# Patient Record
Sex: FEMALE | Race: WHITE | NOT HISPANIC OR LATINO | Employment: OTHER | URBAN - METROPOLITAN AREA
[De-identification: names, ages, dates, MRNs, and addresses within clinical notes are randomized per-mention and may not be internally consistent; named-entity substitution may affect disease eponyms.]

---

## 2024-07-10 ENCOUNTER — OFFICE VISIT (OUTPATIENT)
Dept: GYNECOLOGY | Facility: CLINIC | Age: 69
End: 2024-07-10
Payer: MEDICARE

## 2024-07-10 VITALS
DIASTOLIC BLOOD PRESSURE: 82 MMHG | BODY MASS INDEX: 25.66 KG/M2 | SYSTOLIC BLOOD PRESSURE: 110 MMHG | HEIGHT: 65 IN | WEIGHT: 154 LBS

## 2024-07-10 DIAGNOSIS — N81.2 CYSTOCELE WITH SECOND DEGREE UTERINE PROLAPSE: Primary | ICD-10-CM

## 2024-07-10 PROCEDURE — 99203 OFFICE O/P NEW LOW 30 MIN: CPT | Performed by: OBSTETRICS & GYNECOLOGY

## 2024-07-10 RX ORDER — OMEPRAZOLE 20 MG/1
20 CAPSULE, DELAYED RELEASE ORAL DAILY
COMMUNITY
Start: 2024-05-27

## 2024-07-10 RX ORDER — BUDESONIDE AND FORMOTEROL FUMARATE DIHYDRATE 80; 4.5 UG/1; UG/1
2 AEROSOL RESPIRATORY (INHALATION) 2 TIMES DAILY
COMMUNITY

## 2024-07-10 NOTE — PROGRESS NOTES
"Assessment/Plan:    Diagnoses and all orders for this visit:    Cystocele with second degree uterine prolapse    Other orders  -     omeprazole (PriLOSEC) 20 mg delayed release capsule; Take 20 mg by mouth daily Take before a meal  -     fexofenadine (ALLEGRA ODT) 30 MG disintegrating tablet; Take 30 mg by mouth daily  -     budesonide-formoterol (SYMBICORT) 80-4.5 MCG/ACT inhaler; Inhale 2 puffs 2 (two) times a day Rinse mouth after use.    History of tubal ligation      Subjective: Vaginal bulge    Social history , retired, denies tobacco abuse or alcohol abuse.   Patient ID: Thania Bunch is a 68 y.o. female.    HPI  68-year-old female  2 para 2.  Occasionally has issues with constipation, she does occasionally have to strain.  Denies hot flashes or vaginal dryness.  She is no longer sexually active.  Recently moved from [place] was doing a lot of lifting.  Concern with possible uterine prolapse.  Exam here this morning with chaperone present cystocele with second-degree uterine prolapse present.  Denies DAMON symptoms or other issues with bladder leakage.  She does do Kegel exercises, recommend she continue doing this.  Follow-up in about 3 months time for annual exam and follow-up care.  No intervention needed at this time for minimally symptomatic second-degree  uterine prolapse    Review of Systems   Respiratory: Negative.     Cardiovascular: Negative.    Gastrointestinal: Negative.    Neurological: Negative.    Psychiatric/Behavioral: Negative.     All other systems reviewed and are negative.      Objective: No acute distress  /82 (BP Location: Left arm, Patient Position: Sitting, Cuff Size: Standard)   Ht 5' 5.25\" (1.657 m)   Wt 69.9 kg (154 lb)   BMI 25.43 kg/m²    Physical Exam  Vitals and nursing note reviewed. Exam conducted with a chaperone present.   Constitutional:       Appearance: Normal appearance. She is normal weight.   HENT:      Head: Normocephalic.   Eyes:      " Extraocular Movements: Extraocular movements intact.   Pulmonary:      Effort: Pulmonary effort is normal.   Abdominal:      General: Abdomen is flat.   Genitourinary:     General: Normal vulva.      Comments: Normal external genitalia  Normal size uterus, anteverted, second-degree uterine prolapse with small cystocele present.  Normal cervix  No CMT  No pelvic masses  Musculoskeletal:         General: Normal range of motion.      Cervical back: Normal range of motion.   Skin:     General: Skin is warm and dry.   Neurological:      Mental Status: She is alert and oriented to person, place, and time.   Psychiatric:         Mood and Affect: Mood normal.         Behavior: Behavior normal.         Thought Content: Thought content normal.         Judgment: Judgment normal.        Please note    In addition to the time spent discussing the findings and results of today's visit and exam, I spent approximately 30 minutes of face-to-face time with the patient, greater than 50% of which was spent in counseling and coordination of care for this patient.

## 2024-12-11 ENCOUNTER — EVALUATION (OUTPATIENT)
Age: 69
End: 2024-12-11
Payer: MEDICARE

## 2024-12-11 DIAGNOSIS — N81.4 UTEROVAGINAL PROLAPSE: Primary | ICD-10-CM

## 2024-12-11 PROCEDURE — 97162 PT EVAL MOD COMPLEX 30 MIN: CPT | Performed by: PHYSICAL THERAPIST

## 2024-12-11 NOTE — PROGRESS NOTES
PT Evaluation     Today's date: 2024  Patient name: Thania Bunch  : 1955  MRN: [id number]  Referring provider: Georgi Sanchez MD  Dx:   Encounter Diagnosis     ICD-10-CM    1. Uterovaginal prolapse  N81.4                      Assessment  Impairments: abnormal coordination, abnormal muscle firing, abnormal muscle tone, impaired physical strength and lacks appropriate home exercise program    Assessment details: Thania Bunch is a 69 y.o. female who presents to physical therapy with pelvic organ prolapse. Internal assessment will be performed at [facility] due to time constraint during IE. Functional impairments include occasional DAMON, feeling of heaviness with prolonged standing and walking, and heaviness with lifting. Physical findings include pelvic floor weakness, poor pelvic floor endurance, fair coordination among breathing, core, and pelvic floor muscles, and pelvic organ prolapse (degree to be assessed at next visit). Thania Bunch would benefit from formal physical therapy to address impairments as detailed, decrease pain, and restore maximal level of function for all home, work, self-care, health maintenance, and mobility tasks. Thank you for this referral.    Understanding of Dx/Px/POC: good     Prognosis: good    Goals  Short term goals (to be met in 4 weeks):  1. Pt will walk for 30 minutes without increase in heaviness.  2. Pt will demonstrate pelvic floor strength at least 3/5 to lift organs against gravity.    Long term goals (to be met by discharge):  1. Pt will be compliant with HEP.  2. Pt will be able to walk or hike for at least 1 hour without increased feeling of heaviness.  3. Pt will report resolution of DAMON with coughing and sneezing.      Plan  Patient would benefit from: PT eval and skilled physical therapy  Planned modality interventions: biofeedback    Planned therapy interventions: abdominal trunk stabilization, activity modification, joint mobilization, manual therapy,  massage, Shaffer taping, neuromuscular re-education, patient education, postural training, strengthening, stretching, therapeutic activities, therapeutic exercise, behavior modification, body mechanics training, breathing training, home exercise program, IASTM and kinesiology taping    Frequency: 1x week  Duration in weeks: 12  Treatment plan discussed with: patient      PT Pelvic Floor Subjective:   History of Present Illness:   Thania and her  moved from [place] to [place] in March 2024, did a lot of carrying furniture in and out of the house. She was diagnosed with level 1 or 2 prolapse, able to see cervix. She called her daughter, doctor at [facility]. Daughter suggested pelvic floor PT. Thania continues to feel heaviness into her vagina, is disrupting her lifestyle. If she has too much activity, walking around, or being up against gravity, she must sit for a while due to discomfort and feeling that things are coming down. She is an avid hiker and has had to avoid activity due to prolapse. When she sits, she does not really feel prolapse. She notices it more with standing too much, not having BM daily, and walking.  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)  Diet and Exercise:    Diet:balanced nutrition    Exercise type: walking    hiking  OB/ gyn History    Gestational History:     Prior Pregnancy: Yes      Number of prior pregnancies: 4    Number of term pregnancies: 2    Delivery Type: vaginal delivery      Number of vaginal deliveries: 2  no delivery complications    Menstrual History:      Menopausal: menopause (early 50s)  no hormone replacement therapy  Bladder Function:     Voiding Difficulties negative for: urgency, frequent urination, hesitancy, straining and incomplete emptying       Voiding Difficulties comments:      Voiding frequency: every 1-2 hours and every 3-4 hours    Urinary leakage: urine leakage (only with full bladder and cough/sneeze)    Urinary leakage aggravated by: coughing and sneezing    Urinary leakage not aggravated by: post-void dribble    Nocturia (episodes per night): 1    Painful urination: No      Fluid Intake Type:  Water and tea  Incontinence Management:     Pads/Diaper Use:  Day    Pads/Diapers Additional Comments: always wears a liner due to vaginal leakage, more for security    Patient has attempted at least 4 weeks of independent pelvic floor strengthening exercises without a resolution of symptoms  Bowel Function:     Voiding DIfficulties: unfinished feeling after defecating and constipation      Bowel Function comments:  Gets constipated when traveling, sometimes takes Miralax prn    Bowel frequency: daily and every 2 days    [place] Stool Scale: type 4  Sexual Function:     Sexually Active:  Not sexually active  Pain:     No pain reported by patient.    Onset:  7-12 months ago    Aggravating factors:  Walking and prolonged positions    Duration of symptoms:  Brief    Relieving factors:  Change in position and rest    Progression:  Worsening  Diagnostic Tests:     None    Treatments: no  Patient Goals:     Patient goals for therapy:  Return to sport/leisure activities, improved quality of life and improved comfort    Other patient goals:  Get back to more active lifestyle, be able to stand for a long time and walk daily      Objective     Static Posture     Comments  Posture WNL  Neg Gillet's B    Active Range of Motion     Lumbar   Flexion:  WFL  Extension: Active lumbar extension: slight pressure.  WFL  Left lateral flexion:  WFL  Right lateral flexion:  WFL  Left rotation:  WFL  Right rotation:  WFL    Strength/Myotome Testing     Left Hip   Planes of Motion   Flexion: 5  External rotation: 5  Internal rotation: 5    Right Hip   Planes of Motion   Flexion: 4+  External rotation: 5  Internal  rotation: 5    Additional Strength Details  History of bursitis in both hips    Tests     Left Hip   Negative GINNY, FADIR and Gillet's.   SLR: Negative.     Right Hip   Negative GINNY, FADIR and Gillet's.   SLR: Negative.       Abdominal Assessment:      Position: supine exam  Abdominal Assessment: Excellent diaphragmatic breath  No TTP      Diastatis   Connective tissue integrity at linea alba: firm  no tenderness at linea alba  able to engage transverse abdominis   Palpation of linea alba:Did not assess, history of hiatal hernia    Visual Inspection of Perineum:   PFM Contraction Comments: External palpation over EAS: good understanding of contract and relax    Pelvic Organ Prolapse   Comments: Assess at [facility]        Pelvic Floor Muscle Exam:             PERFECT Score        Perfect Score: Assess at [facility]                   Precautions: low blood pressure, acid reflux, seasonal allergies      Manuals 12/11            Internal assess nv                                                   Neuro Re-Ed             TA + PF + hip add HEP            TA + PF + TB hip abd HEP                                                                             Ther Ex                                                                                                                     Ther Activity             Bowel mechanics nv            Lifiting mechanics prn            TA + PF + STS nv            Gait Training                                       Modalities

## 2024-12-11 NOTE — LETTER
2024    Georgi Sanchez MD  [address]  [address] [place] [id number]    Patient: Thania Bunch   YOB: 1955   Date of Visit: 2024     Encounter Diagnosis     ICD-10-CM    1. Uterovaginal prolapse  N81.4           Dear Dr. Sanchez:    Thank you for your recent referral of Thania Bunch. Please review the attached evaluation summary from Thania's recent visit.     Please verify that you agree with the plan of care by signing the attached order.     If you have any questions or concerns, please do not hesitate to call.     I sincerely appreciate the opportunity to share in the care of one of your patients and hope to have another opportunity to work with you in the near future.       Sincerely,    Lolly Hoff, PT      Referring Provider:      I certify that I have read the below Plan of Care and certify the need for these services furnished under this plan of treatment while under my care.                    Georgi Sanchez MD  [address]  [address] [place] [address]  Via Fax: [phone]          PT Evaluation     Today's date: 2024  Patient name: Thania Bunch  : 1955  MRN: [id number]  Referring provider: Georgi Sanchez MD  Dx:   Encounter Diagnosis     ICD-10-CM    1. Uterovaginal prolapse  N81.4                      Assessment  Impairments: abnormal coordination, abnormal muscle firing, abnormal muscle tone, impaired physical strength and lacks appropriate home exercise program    Assessment details: Thania Bunch is a 69 y.o. female who presents to physical therapy with pelvic organ prolapse. Internal assessment will be performed at [facility] due to time constraint during IE. Functional impairments include occasional DAMON, feeling of heaviness with prolonged standing and walking, and heaviness with lifting. Physical findings include pelvic floor weakness, poor pelvic floor endurance, fair coordination among breathing,  core, and pelvic floor muscles, and pelvic organ prolapse (degree to be assessed at next visit). Thania Bunch would benefit from formal physical therapy to address impairments as detailed, decrease pain, and restore maximal level of function for all home, work, self-care, health maintenance, and mobility tasks. Thank you for this referral.    Understanding of Dx/Px/POC: good     Prognosis: good    Goals  Short term goals (to be met in 4 weeks):  1. Pt will walk for 30 minutes without increase in heaviness.  2. Pt will demonstrate pelvic floor strength at least 3/5 to lift organs against gravity.    Long term goals (to be met by discharge):  1. Pt will be compliant with HEP.  2. Pt will be able to walk or hike for at least 1 hour without increased feeling of heaviness.  3. Pt will report resolution of DAMON with coughing and sneezing.      Plan  Patient would benefit from: PT eval and skilled physical therapy  Planned modality interventions: biofeedback    Planned therapy interventions: abdominal trunk stabilization, activity modification, joint mobilization, manual therapy, massage, Shaffer taping, neuromuscular re-education, patient education, postural training, strengthening, stretching, therapeutic activities, therapeutic exercise, behavior modification, body mechanics training, breathing training, home exercise program, IASTM and kinesiology taping    Frequency: 1x week  Duration in weeks: 12  Treatment plan discussed with: patient      PT Pelvic Floor Subjective:   History of Present Illness:   Thania and her  moved from [place] to [place] in March 2024, did a lot of carrying furniture in and out of the house. She was diagnosed with level 1 or 2 prolapse, able to see cervix. She called her daughter, doctor at [facility]. Daughter suggested pelvic floor PT. Thania continues to feel heaviness into her vagina, is disrupting her lifestyle. If she has too much activity, walking around, or being up  against gravity, she must sit for a while due to discomfort and feeling that things are coming down. She is an avid hiker and has had to avoid activity due to prolapse. When she sits, she does not really feel prolapse. She notices it more with standing too much, not having BM daily, and walking.  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[employer])  Diet and Exercise:    Diet:balanced nutrition    Exercise type: walking    hiking  OB/ gyn History    Gestational History:     Prior Pregnancy: Yes      Number of prior pregnancies: 4    Number of term pregnancies: 2    Delivery Type: vaginal delivery      Number of vaginal deliveries: 2  no delivery complications    Menstrual History:      Menopausal: menopause (early 50s)  no hormone replacement therapy  Bladder Function:     Voiding Difficulties negative for: urgency, frequent urination, hesitancy, straining and incomplete emptying       Voiding Difficulties comments:     Voiding frequency: every 1-2 hours and every 3-4 hours    Urinary leakage: urine leakage (only with full bladder and cough/sneeze)    Urinary leakage aggravated by: coughing and sneezing    Urinary leakage not aggravated by: post-void dribble    Nocturia (episodes per night): 1    Painful urination: No      Fluid Intake Type:  Water and tea  Incontinence Management:     Pads/Diaper Use:  Day    Pads/Diapers Additional Comments: always wears a liner due to vaginal leakage, more for security    Patient has attempted at least 4 weeks of independent pelvic floor strengthening exercises without a resolution of symptoms  Bowel Function:     Voiding DIfficulties: unfinished feeling after defecating and constipation      Bowel Function comments:  Gets constipated when traveling, sometimes takes Miralax prn    Bowel frequency: daily and every 2  days    [place] Stool Scale: type 4  Sexual Function:     Sexually Active:  Not sexually active  Pain:     No pain reported by patient.    Onset:  7-12 months ago    Aggravating factors:  Walking and prolonged positions    Duration of symptoms:  Brief    Relieving factors:  Change in position and rest    Progression:  Worsening  Diagnostic Tests:     None    Treatments: no  Patient Goals:     Patient goals for therapy:  Return to sport/leisure activities, improved quality of life and improved comfort    Other patient goals:  Get back to more active lifestyle, be able to stand for a long time and walk daily      Objective     Static Posture     Comments  Posture WNL  Neg Gillet's B    Active Range of Motion     Lumbar   Flexion:  WFL  Extension: Active lumbar extension: slight pressure.  WFL  Left lateral flexion:  WFL  Right lateral flexion:  WFL  Left rotation:  WFL  Right rotation:  WFL    Strength/Myotome Testing     Left Hip   Planes of Motion   Flexion: 5  External rotation: 5  Internal rotation: 5    Right Hip   Planes of Motion   Flexion: 4+  External rotation: 5  Internal rotation: 5    Additional Strength Details  History of bursitis in both hips    Tests     Left Hip   Negative GINNY, FADIR and Gillet's.   SLR: Negative.     Right Hip   Negative GINNY, FADIR and Gillet's.   SLR: Negative.       Abdominal Assessment:      Position: supine exam  Abdominal Assessment: Excellent diaphragmatic breath  No TTP      Diastatis   Connective tissue integrity at linea alba: firm  no tenderness at linea alba  able to engage transverse abdominis   Palpation of linea alba:Did not assess, history of hiatal hernia    Visual Inspection of Perineum:   PFM Contraction Comments: External palpation over EAS: good understanding of contract and relax    Pelvic Organ Prolapse   Comments: Assess at [facility]        Pelvic Floor Muscle Exam:             PERFECT Score        Perfect Score: Assess at [facility]                   Precautions: low  blood pressure, acid reflux, seasonal allergies      Manuals 12/11            Internal assess nv                                                   Neuro Re-Ed             TA + PF + hip add HEP            TA + PF + TB hip abd HEP                                                                             Ther Ex                                                                                                                     Ther Activity             Bowel mechanics nv            Lifiting mechanics prn            TA + PF + STS nv            Gait Training                                       Modalities

## 2024-12-18 ENCOUNTER — OFFICE VISIT (OUTPATIENT)
Age: 69
End: 2024-12-18
Payer: MEDICARE

## 2024-12-18 DIAGNOSIS — N81.4 UTEROVAGINAL PROLAPSE: Primary | ICD-10-CM

## 2024-12-18 PROCEDURE — 97530 THERAPEUTIC ACTIVITIES: CPT | Performed by: PHYSICAL THERAPIST

## 2024-12-18 PROCEDURE — 97140 MANUAL THERAPY 1/> REGIONS: CPT | Performed by: PHYSICAL THERAPIST

## 2024-12-18 NOTE — PROGRESS NOTES
Daily Note     Today's date: 2024  Patient name: Thania Bunch  : 1955  MRN: [id number]  Referring provider: Georgi Sanchez MD  Dx:   Encounter Diagnosis     ICD-10-CM    1. Uterovaginal prolapse  N81.4                      Subjective: She would love to return to cross country skiing. She started her exercises yesterday and today. She has not had any issues with HEP. Thania was standing for greater than 5 hours and notes that her prolapse felt worse.      Objective: See treatment diary below    External:  Skin, sensation, cough reflex, and anal wink intact  Good contract/relax/bulge    Internal:  No TTP  Visible weakness of anterior vaginal wall  PERF: 3+/4/8      Assessment: Tolerated treatment well. Patient exhibited good technique with therapeutic exercises and would benefit from continued PT to decrease extent of prolapse. Thania had good tolerance to internal assessment with good contract, relax, and bulge, but fair strength and endurance. She has weakness of anterior vaginal wall to opening of vagina with bearing down in hooklying position. Recommended being mindful of lifting, performing lifts with REBECA about hip distance, and exhale. She verbalized understanding of bowel mechanics and sit to stand transfers. She will try this at home and assess effectiveness at [facility].      Plan: Continue per plan of care.  Progress treatment as tolerated.       Precautions: low blood pressure, acid reflux, seasonal allergies      Manuals            Internal assess nv ED                                                  Neuro Re-Ed             TA + PF + hip add HEP            TA + PF + TB hip abd HEP                                                                             Ther Ex                                                                                                                     Ther Activity             Bowel mechanics nv reviewed           Lifiting mechanics prn reviewed            TA + PF + STS nv Reviewed, 5x           Gait Training                                       Modalities

## 2025-01-08 ENCOUNTER — OFFICE VISIT (OUTPATIENT)
Age: 70
End: 2025-01-08
Payer: MEDICARE

## 2025-01-08 DIAGNOSIS — N81.4 UTEROVAGINAL PROLAPSE: Primary | ICD-10-CM

## 2025-01-08 PROCEDURE — 97110 THERAPEUTIC EXERCISES: CPT | Performed by: PHYSICAL THERAPIST

## 2025-01-08 PROCEDURE — 97112 NEUROMUSCULAR REEDUCATION: CPT | Performed by: PHYSICAL THERAPIST

## 2025-01-08 NOTE — PROGRESS NOTES
"Daily Note     Today's date: 2025  Patient name: Thania Bunch  : 1955  MRN: [id number]  Referring provider: Georgi Sanchez MD  Dx:   Encounter Diagnosis     ICD-10-CM    1. Uterovaginal prolapse  N81.4                      Subjective: Thania did not have any issues or soreness after LV. HEP is going well. Starting about 1 week ago, her symptoms have started to improve. She notes that she does not feel prolapse as often or as acutely. She remembers more than half the time to breathe when going from a sitting to standing position. She has not had any issues with BM.      Objective: See treatment diary below    External:  Skin, sensation, cough reflex, and anal wink intact  Good contract/relax/bulge    Internal:  No TTP  Visible weakness of anterior vaginal wall  PERF: 3+//      Assessment: Tolerated treatment well. Patient demonstrated fatigue post treatment, exhibited good technique with therapeutic exercises, and would benefit from continued PT to decrease extent of prolapse. Thania had good tolerance to progression of exercises. She noted fatigue in pelvic floor with standing exercises but did not have any feeling of descent. She had some tightness in L UT from TB exercises, will apply CP at home as needed. Thania was given updated HEP and verbalized understanding.      Plan: Continue per plan of care.  Progress treatment as tolerated.       Precautions: low blood pressure, acid reflux, seasonal allergies      Manuals 25          Internal assess nv ED                                                  Neuro Re-Ed             TA + PF + hip add HEP  3\"x15          TA + PF + TB hip abd HEP  3\"x15 gr TB          TA iso prone on pillows   3\"x10          PF iso prone on pillows   3\"x10          TA + PF + hip ER prone on pillows   3\"x10           TA + PF + hip IR prone on pillows   3\"x10 Gr TB                       Ther Ex             TA + PF + TB shoulder ext   Purple 15x    "       TA + PF + TB low rows   Purple 15x          TA + PF + TB push pull   Purple 1 ply 15x ea                                                                           Ther Activity             Bowel mechanics nv reviewed           Lifiting mechanics prn reviewed           TA + PF + STS nv Reviewed, 5x           Gait Training                                       Modalities

## 2025-07-29 ENCOUNTER — HOSPITAL ENCOUNTER (EMERGENCY)
Facility: HOSPITAL | Age: 70
Discharge: HOME | End: 2025-07-29
Attending: EMERGENCY MEDICINE | Admitting: EMERGENCY MEDICINE
Payer: MEDICARE

## 2025-07-29 VITALS
BODY MASS INDEX: 23.81 KG/M2 | TEMPERATURE: 97.9 F | OXYGEN SATURATION: 100 % | DIASTOLIC BLOOD PRESSURE: 84 MMHG | SYSTOLIC BLOOD PRESSURE: 167 MMHG | WEIGHT: 144.18 LBS | RESPIRATION RATE: 17 BRPM | HEART RATE: 62 BPM

## 2025-07-29 DIAGNOSIS — Z23 NEED FOR IMMUNIZATION AGAINST RABIES: ICD-10-CM
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
## 2025-07-29 PROCEDURE — 99284 EMERGENCY DEPT VISIT MOD MDM: CPT

## 2025-07-29 PROCEDURE — 99283 EMERGENCY DEPT VISIT LOW MDM: CPT

## 2025-07-29 PROCEDURE — 90471 IMMUNIZATION ADMIN: CPT

## 2025-07-29 PROCEDURE — 90375 RABIES IG IM/SC: CPT

## 2025-07-29 PROCEDURE — 90675 RABIES VACCINE IM: CPT

## 2025-07-29 PROCEDURE — 96372 THER/PROPH/DIAG INJ SC/IM: CPT

## 2025-07-29 RX ORDER — GINSENG 100 MG
1 CAPSULE ORAL ONCE
Status: COMPLETED | OUTPATIENT
Start: 2025-07-29 | End: 2025-07-29

## 2025-07-29 RX ADMIN — BACITRACIN 1 SMALL APPLICATION: 500 OINTMENT TOPICAL at 11:02

## 2025-07-29 RX ADMIN — RABIES VIRUS STRAIN PM-1503-3M ANTIGEN (PROPIOLACTONE INACTIVATED) AND WATER 1 ML: KIT at 11:02

## 2025-07-29 RX ADMIN — RABIES IMMUNE GLOBULIN (HUMAN) 1500 UNITS: 300 INJECTION, SOLUTION INFILTRATION; INTRAMUSCULAR at 11:00

## 2025-08-02 ENCOUNTER — HOSPITAL ENCOUNTER (EMERGENCY)
Facility: HOSPITAL | Age: 70
Discharge: HOME | End: 2025-08-02
Attending: EMERGENCY MEDICINE | Admitting: EMERGENCY MEDICINE
Payer: MEDICARE

## 2025-08-02 VITALS
DIASTOLIC BLOOD PRESSURE: 77 MMHG | RESPIRATION RATE: 18 BRPM | BODY MASS INDEX: 23.78 KG/M2 | WEIGHT: 144 LBS | TEMPERATURE: 98 F | SYSTOLIC BLOOD PRESSURE: 130 MMHG | OXYGEN SATURATION: 96 % | HEART RATE: 61 BPM

## 2025-08-02 DIAGNOSIS — Z23 ENCOUNTER FOR REPEAT ADMINISTRATION OF RABIES VACCINATION: Primary | ICD-10-CM

## 2025-08-02 PROCEDURE — 90675 RABIES VACCINE IM: CPT | Performed by: EMERGENCY MEDICINE

## 2025-08-02 PROCEDURE — 90471 IMMUNIZATION ADMIN: CPT

## 2025-08-02 PROCEDURE — 99282 EMERGENCY DEPT VISIT SF MDM: CPT | Performed by: EMERGENCY MEDICINE

## 2025-08-02 RX ADMIN — RABIES VIRUS STRAIN PM-1503-3M ANTIGEN (PROPIOLACTONE INACTIVATED) AND WATER 1 ML: KIT at 07:07

## 2025-08-05 ENCOUNTER — OFFICE VISIT (OUTPATIENT)
Dept: URGENT CARE | Facility: MEDICAL CENTER | Age: 70
End: 2025-08-05
Payer: MEDICARE

## 2025-08-05 VITALS
BODY MASS INDEX: 25.52 KG/M2 | HEART RATE: 64 BPM | HEIGHT: 65 IN | WEIGHT: 153.2 LBS | OXYGEN SATURATION: 97 % | RESPIRATION RATE: 18 BRPM | DIASTOLIC BLOOD PRESSURE: 80 MMHG | SYSTOLIC BLOOD PRESSURE: 118 MMHG | TEMPERATURE: 97.7 F
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
## 2025-08-05 PROCEDURE — G0463 HOSPITAL OUTPT CLINIC VISIT: HCPCS | Performed by: NURSE PRACTITIONER

## 2025-08-05 PROCEDURE — 90675 RABIES VACCINE IM: CPT

## 2025-08-05 PROCEDURE — 99203 OFFICE O/P NEW LOW 30 MIN: CPT | Performed by: NURSE PRACTITIONER

## 2025-08-05 PROCEDURE — 90471 IMMUNIZATION ADMIN: CPT | Performed by: NURSE PRACTITIONER

## 2025-08-12 ENCOUNTER — CLINICAL SUPPORT (OUTPATIENT)
Dept: URGENT CARE | Facility: MEDICAL CENTER | Age: 70
End: 2025-08-12
Payer: MEDICARE